# Patient Record
(demographics unavailable — no encounter records)

---

## 2024-11-27 NOTE — PHYSICAL EXAM
[de-identified] : NAD. BMI 22.2 [de-identified] : Normal respiratory effort. [de-identified] : The patient appeared uncomfortable during the initial discussion and genital exam was deferred.

## 2024-11-27 NOTE — PHYSICAL EXAM
[de-identified] : NAD. BMI 22.2 [de-identified] : Normal respiratory effort. [de-identified] : The patient appeared uncomfortable during the initial discussion and genital exam was deferred.

## 2024-11-27 NOTE — PHYSICAL EXAM
[de-identified] : NAD. BMI 22.2 [de-identified] : Normal respiratory effort. [de-identified] : The patient appeared uncomfortable during the initial discussion and genital exam was deferred.

## 2024-11-27 NOTE — ASSESSMENT
[FreeTextEntry1] : Exam was deferred. The patient appeared uncomfortable during the discussion. As there was a delay in seeing the patient, discussion with the , IR, was offered, and the patient accepted. The patient may follow-up for further discussion, if desired.  I, Dr. Ramos, personally performed the evaluation and management (E/M) services for this new patient. That E/M includes conducting the clinically appropriate initial history &/or exam, assessing all conditions, and establishing the plan of care. Today, my ARELIS, Delroy Salter PA-C, was here to observe my evaluation and management service for this patient & follow plan of care established by me going forward.

## 2024-11-27 NOTE — HISTORY OF PRESENT ILLNESS
[FreeTextEntry1] : 25yo woman designated male at birth (Tony; she/her) presents for consultation for vaginoplasty. She is primarily interested in vaginal receptive intercourse with a penis. She has been on feminizing hormones for 4 years. She tucks with tight underwear, no tape. She hasn't had sperm stored for fertility preservation and expresses understanding that no additional sperm would be able to be retrievable following vaginoplasty and orchiectomy. She has no desire to do so. She is capable of orgasm. She denies any hernias or masses. She is uncircumcised and is not undergone any genital operations. She has not undergone any type of hair removal on her genitals. She denies any history of DVT/PE. She also denies any history of issues with general anesthesia.  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 Lives alone but has a supportive friend that will be staying with her after surgery. Endorses marijuana use. The patient agreed to avoid all marijuana smoking for 6 weeks before surgery and 6 weeks after surgery and to avoid all THC use for 2 weeks before and after surgery. Denies tobacco, alcohol, and any other recreational drug use. Patient denies any history of psychiatric hospitalization or ER admission.

## 2024-11-27 NOTE — HISTORY OF PRESENT ILLNESS
[FreeTextEntry1] : 25yo woman designated male at birth (Tony; she/her) presents for consultation for vaginoplasty. She is primarily interested in vaginal receptive intercourse with a penis. She has been on feminizing hormones for 4 years. She tucks with tight underwear, no tape. She hasn't had sperm stored for fertility preservation and expresses understanding that no additional sperm would be able to be retrievable following vaginoplasty and orchiectomy. She has no desire to do so. She is capable of orgasm. She denies any hernias or masses. She is uncircumcised and is not undergone any genital operations. She has not undergone any type of hair removal on her genitals. She denies any history of DVT/PE. She also denies any history of issues with general anesthesia.  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 Lives alone but has a supportive friend that will be staying with her after surgery. Endorses marijuana use. The patient agreed to avoid all marijuana smoking for 6 weeks before surgery and 6 weeks after surgery and to avoid all THC use for 2 weeks before and after surgery. Denies tobacco, alcohol, and any other recreational drug use. Patient denies any history of psychiatric hospitalization or ER admission.

## 2024-11-27 NOTE — HISTORY OF PRESENT ILLNESS
[FreeTextEntry1] : 25yo woman designated male at birth (Tony; she/her) presents for consultation for vaginoplasty. She is primarily interested in vaginal receptive intercourse with a penis. She has been on feminizing hormones for 4 years. She tucks with tight underwear, no tape. She hasn't had sperm stored for fertility preservation and expresses understanding that no additional sperm would be able to be retrievable following vaginoplasty and orchiectomy. She has no desire to do so. She is capable of orgasm. She denies any hernias or masses. She is uncircumcised and is not undergone any genital operations. She has not undergone any type of hair removal on her genitals. She denies any history of DVT/PE. She also denies any history of issues with general anesthesia.  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 Lives alone but has a supportive friend that will be staying with her after surgery. Endorses marijuana use. The patient agreed to avoid all marijuana smoking for 6 weeks before surgery and 6 weeks after surgery and to avoid all THC use for 2 weeks before and after surgery. Denies tobacco, alcohol, and any other recreational drug use. Patient denies any history of psychiatric hospitalization or ER admission.